# Patient Record
Sex: MALE | Race: WHITE | NOT HISPANIC OR LATINO | Employment: FULL TIME | URBAN - METROPOLITAN AREA
[De-identification: names, ages, dates, MRNs, and addresses within clinical notes are randomized per-mention and may not be internally consistent; named-entity substitution may affect disease eponyms.]

---

## 2023-05-16 ENCOUNTER — TELEPHONE (OUTPATIENT)
Dept: PRIMARY CARE | Facility: CLINIC | Age: 58
End: 2023-05-16
Payer: COMMERCIAL

## 2023-05-16 DIAGNOSIS — Z00.00 HEALTH MAINTENANCE EXAMINATION: ICD-10-CM

## 2023-05-16 NOTE — TELEPHONE ENCOUNTER
Pt is scheduled for physical 06/29/23  Need Labs will go to lab downstairs  Pt also is tired all the times insurance won't pay for Thyroid testing unless it is deemed medically necessary.

## 2023-05-23 DIAGNOSIS — Z00.00 ENCOUNTER FOR GENERAL ADULT MEDICAL EXAMINATION WITHOUT ABNORMAL FINDINGS: ICD-10-CM

## 2023-05-23 RX ORDER — ATORVASTATIN CALCIUM 20 MG/1
TABLET, FILM COATED ORAL
Qty: 90 TABLET | Refills: 3 | Status: SHIPPED | OUTPATIENT
Start: 2023-05-23

## 2023-06-15 ENCOUNTER — LAB (OUTPATIENT)
Dept: LAB | Facility: LAB | Age: 58
End: 2023-06-15
Payer: COMMERCIAL

## 2023-06-15 DIAGNOSIS — Z00.00 HEALTH MAINTENANCE EXAMINATION: ICD-10-CM

## 2023-06-15 LAB
ALANINE AMINOTRANSFERASE (SGPT) (U/L) IN SER/PLAS: 45 U/L (ref 10–52)
ALBUMIN (G/DL) IN SER/PLAS: 4.5 G/DL (ref 3.4–5)
ALKALINE PHOSPHATASE (U/L) IN SER/PLAS: 74 U/L (ref 33–120)
ANION GAP IN SER/PLAS: 16 MMOL/L (ref 10–20)
ASPARTATE AMINOTRANSFERASE (SGOT) (U/L) IN SER/PLAS: 23 U/L (ref 9–39)
BASOPHILS (10*3/UL) IN BLOOD BY AUTOMATED COUNT: 0.05 X10E9/L (ref 0–0.1)
BASOPHILS/100 LEUKOCYTES IN BLOOD BY AUTOMATED COUNT: 0.8 % (ref 0–2)
BILIRUBIN TOTAL (MG/DL) IN SER/PLAS: 1 MG/DL (ref 0–1.2)
CALCIUM (MG/DL) IN SER/PLAS: 10.3 MG/DL (ref 8.6–10.6)
CARBON DIOXIDE, TOTAL (MMOL/L) IN SER/PLAS: 28 MMOL/L (ref 21–32)
CHLORIDE (MMOL/L) IN SER/PLAS: 102 MMOL/L (ref 98–107)
CHOLESTEROL (MG/DL) IN SER/PLAS: 206 MG/DL (ref 0–199)
CHOLESTEROL IN HDL (MG/DL) IN SER/PLAS: 59.5 MG/DL
CHOLESTEROL/HDL RATIO: 3.5
CREATININE (MG/DL) IN SER/PLAS: 0.99 MG/DL (ref 0.5–1.3)
EOSINOPHILS (10*3/UL) IN BLOOD BY AUTOMATED COUNT: 0.22 X10E9/L (ref 0–0.7)
EOSINOPHILS/100 LEUKOCYTES IN BLOOD BY AUTOMATED COUNT: 3.5 % (ref 0–6)
ERYTHROCYTE DISTRIBUTION WIDTH (RATIO) BY AUTOMATED COUNT: 11.8 % (ref 11.5–14.5)
ERYTHROCYTE MEAN CORPUSCULAR HEMOGLOBIN CONCENTRATION (G/DL) BY AUTOMATED: 33.1 G/DL (ref 32–36)
ERYTHROCYTE MEAN CORPUSCULAR VOLUME (FL) BY AUTOMATED COUNT: 90 FL (ref 80–100)
ERYTHROCYTES (10*6/UL) IN BLOOD BY AUTOMATED COUNT: 5.11 X10E12/L (ref 4.5–5.9)
GFR MALE: 89 ML/MIN/1.73M2
GLUCOSE (MG/DL) IN SER/PLAS: 100 MG/DL (ref 74–99)
HEMATOCRIT (%) IN BLOOD BY AUTOMATED COUNT: 46.2 % (ref 41–52)
HEMOGLOBIN (G/DL) IN BLOOD: 15.3 G/DL (ref 13.5–17.5)
IMMATURE GRANULOCYTES/100 LEUKOCYTES IN BLOOD BY AUTOMATED COUNT: 0.5 % (ref 0–0.9)
LDL: 123 MG/DL (ref 0–99)
LEUKOCYTES (10*3/UL) IN BLOOD BY AUTOMATED COUNT: 6.3 X10E9/L (ref 4.4–11.3)
LYMPHOCYTES (10*3/UL) IN BLOOD BY AUTOMATED COUNT: 1.46 X10E9/L (ref 1.2–4.8)
LYMPHOCYTES/100 LEUKOCYTES IN BLOOD BY AUTOMATED COUNT: 23.2 % (ref 13–44)
MONOCYTES (10*3/UL) IN BLOOD BY AUTOMATED COUNT: 0.51 X10E9/L (ref 0.1–1)
MONOCYTES/100 LEUKOCYTES IN BLOOD BY AUTOMATED COUNT: 8.1 % (ref 2–10)
NEUTROPHILS (10*3/UL) IN BLOOD BY AUTOMATED COUNT: 4.03 X10E9/L (ref 1.2–7.7)
NEUTROPHILS/100 LEUKOCYTES IN BLOOD BY AUTOMATED COUNT: 63.9 % (ref 40–80)
NRBC (PER 100 WBCS) BY AUTOMATED COUNT: 0 /100 WBC (ref 0–0)
PLATELETS (10*3/UL) IN BLOOD AUTOMATED COUNT: 291 X10E9/L (ref 150–450)
POTASSIUM (MMOL/L) IN SER/PLAS: 4.6 MMOL/L (ref 3.5–5.3)
PROSTATE SPECIFIC AG (NG/ML) IN SER/PLAS: 0.78 NG/ML (ref 0–4)
PROTEIN TOTAL: 7 G/DL (ref 6.4–8.2)
SODIUM (MMOL/L) IN SER/PLAS: 141 MMOL/L (ref 136–145)
TRIGLYCERIDE (MG/DL) IN SER/PLAS: 118 MG/DL (ref 0–149)
UREA NITROGEN (MG/DL) IN SER/PLAS: 14 MG/DL (ref 6–23)
VLDL: 24 MG/DL (ref 0–40)

## 2023-06-15 PROCEDURE — 80053 COMPREHEN METABOLIC PANEL: CPT

## 2023-06-15 PROCEDURE — 36415 COLL VENOUS BLD VENIPUNCTURE: CPT

## 2023-06-15 PROCEDURE — 85025 COMPLETE CBC W/AUTO DIFF WBC: CPT

## 2023-06-15 PROCEDURE — 80061 LIPID PANEL: CPT

## 2023-06-15 PROCEDURE — 84153 ASSAY OF PSA TOTAL: CPT

## 2023-06-28 ASSESSMENT — PROMIS GLOBAL HEALTH SCALE
EMOTIONAL_PROBLEMS: RARELY
RATE_GENERAL_HEALTH: GOOD
RATE_SOCIAL_SATISFACTION: GOOD
RATE_QUALITY_OF_LIFE: VERY GOOD
RATE_AVERAGE_PAIN: 0
RATE_MENTAL_HEALTH: GOOD
RATE_PHYSICAL_HEALTH: GOOD
CARRYOUT_PHYSICAL_ACTIVITIES: COMPLETELY
CARRYOUT_SOCIAL_ACTIVITIES: VERY GOOD

## 2023-06-29 ENCOUNTER — OFFICE VISIT (OUTPATIENT)
Dept: PRIMARY CARE | Facility: CLINIC | Age: 58
End: 2023-06-29
Payer: COMMERCIAL

## 2023-06-29 VITALS
TEMPERATURE: 97.4 F | OXYGEN SATURATION: 98 % | HEIGHT: 68 IN | SYSTOLIC BLOOD PRESSURE: 142 MMHG | WEIGHT: 231 LBS | HEART RATE: 72 BPM | BODY MASS INDEX: 35.01 KG/M2 | DIASTOLIC BLOOD PRESSURE: 80 MMHG

## 2023-06-29 DIAGNOSIS — Z00.00 ENCOUNTER FOR ROUTINE HISTORY AND PHYSICAL EXAM FOR MALE: ICD-10-CM

## 2023-06-29 DIAGNOSIS — K44.9 HIATAL HERNIA: ICD-10-CM

## 2023-06-29 DIAGNOSIS — Z23 NEED FOR VACCINATION: ICD-10-CM

## 2023-06-29 DIAGNOSIS — S83.519D RUPTURE OF ANTERIOR CRUCIATE LIGAMENT OF KNEE, UNSPECIFIED LATERALITY, SUBSEQUENT ENCOUNTER: ICD-10-CM

## 2023-06-29 DIAGNOSIS — E78.5 HYPERLIPIDEMIA, UNSPECIFIED HYPERLIPIDEMIA TYPE: Primary | ICD-10-CM

## 2023-06-29 DIAGNOSIS — I10 PRIMARY HYPERTENSION: ICD-10-CM

## 2023-06-29 PROBLEM — S83.419A MEDIAL COLLATERAL LIGAMENT SPRAIN OF KNEE: Status: ACTIVE | Noted: 2023-06-29

## 2023-06-29 PROBLEM — J02.9 PHARYNGITIS: Status: RESOLVED | Noted: 2023-06-29 | Resolved: 2023-06-29

## 2023-06-29 PROBLEM — J30.9 ALLERGIC RHINITIS: Status: ACTIVE | Noted: 2023-06-29

## 2023-06-29 PROBLEM — L82.1 SEBORRHEIC KERATOSIS: Status: ACTIVE | Noted: 2023-06-29

## 2023-06-29 PROBLEM — J98.01 ACUTE BRONCHOSPASM: Status: ACTIVE | Noted: 2023-06-29

## 2023-06-29 PROBLEM — M25.562 LEFT KNEE PAIN: Status: RESOLVED | Noted: 2023-06-29 | Resolved: 2023-06-29

## 2023-06-29 PROBLEM — L98.9 CHANGING SKIN LESION: Status: ACTIVE | Noted: 2023-06-29

## 2023-06-29 PROBLEM — M25.561 RIGHT KNEE PAIN: Status: RESOLVED | Noted: 2023-06-29 | Resolved: 2023-06-29

## 2023-06-29 PROBLEM — S39.012A LUMBAR STRAIN: Status: ACTIVE | Noted: 2023-06-29

## 2023-06-29 PROBLEM — R49.0 HOARSENESS: Status: ACTIVE | Noted: 2023-06-29

## 2023-06-29 PROBLEM — R05.9 COUGH: Status: ACTIVE | Noted: 2023-06-29

## 2023-06-29 PROBLEM — M25.562 LEFT KNEE PAIN: Status: ACTIVE | Noted: 2023-06-29

## 2023-06-29 PROBLEM — L57.0 ACTINIC KERATOSIS OF SCALP: Status: ACTIVE | Noted: 2023-06-29

## 2023-06-29 PROBLEM — M25.552 LEFT HIP PAIN: Status: RESOLVED | Noted: 2023-06-29 | Resolved: 2023-06-29

## 2023-06-29 PROBLEM — J02.9 PHARYNGITIS: Status: ACTIVE | Noted: 2023-06-29

## 2023-06-29 PROBLEM — R74.8 LIVER ENZYME ELEVATION: Status: RESOLVED | Noted: 2023-06-29 | Resolved: 2023-06-29

## 2023-06-29 PROBLEM — S83.206A RIGHT KNEE MENISCAL TEAR: Status: RESOLVED | Noted: 2023-06-29 | Resolved: 2023-06-29

## 2023-06-29 PROBLEM — R06.02 SHORTNESS OF BREATH: Status: ACTIVE | Noted: 2019-01-30

## 2023-06-29 PROBLEM — J30.9 ALLERGIC RHINITIS: Status: RESOLVED | Noted: 2023-06-29 | Resolved: 2023-06-29

## 2023-06-29 PROBLEM — S83.512A LEFT ACL TEAR: Status: ACTIVE | Noted: 2023-06-29

## 2023-06-29 PROBLEM — S83.206A RIGHT KNEE MENISCAL TEAR: Status: ACTIVE | Noted: 2023-06-29

## 2023-06-29 PROBLEM — S83.512A LEFT ACL TEAR: Status: RESOLVED | Noted: 2023-06-29 | Resolved: 2023-06-29

## 2023-06-29 PROBLEM — R06.02 SHORTNESS OF BREATH: Status: RESOLVED | Noted: 2019-01-30 | Resolved: 2023-06-29

## 2023-06-29 PROBLEM — R49.0 HOARSENESS: Status: RESOLVED | Noted: 2023-06-29 | Resolved: 2023-06-29

## 2023-06-29 PROBLEM — S76.219A STRAIN OF GROIN: Status: RESOLVED | Noted: 2023-06-29 | Resolved: 2023-06-29

## 2023-06-29 PROBLEM — S83.519A TORN ACL: Status: ACTIVE | Noted: 2023-06-29

## 2023-06-29 PROBLEM — R74.8 LIVER ENZYME ELEVATION: Status: ACTIVE | Noted: 2023-06-29

## 2023-06-29 PROBLEM — H61.23 IMPACTED CERUMEN OF BOTH EARS: Status: ACTIVE | Noted: 2023-06-29

## 2023-06-29 PROBLEM — M25.552 LEFT HIP PAIN: Status: ACTIVE | Noted: 2023-06-29

## 2023-06-29 PROBLEM — S76.219A STRAIN OF GROIN: Status: ACTIVE | Noted: 2023-06-29

## 2023-06-29 PROBLEM — M25.569 JOINT PAIN, KNEE: Status: ACTIVE | Noted: 2023-06-29

## 2023-06-29 PROBLEM — M25.561 RIGHT KNEE PAIN: Status: ACTIVE | Noted: 2023-06-29

## 2023-06-29 PROCEDURE — 3077F SYST BP >= 140 MM HG: CPT | Performed by: FAMILY MEDICINE

## 2023-06-29 PROCEDURE — 93000 ELECTROCARDIOGRAM COMPLETE: CPT | Performed by: FAMILY MEDICINE

## 2023-06-29 PROCEDURE — 90471 IMMUNIZATION ADMIN: CPT | Performed by: FAMILY MEDICINE

## 2023-06-29 PROCEDURE — 99386 PREV VISIT NEW AGE 40-64: CPT | Performed by: FAMILY MEDICINE

## 2023-06-29 PROCEDURE — 90715 TDAP VACCINE 7 YRS/> IM: CPT | Performed by: FAMILY MEDICINE

## 2023-06-29 PROCEDURE — 3079F DIAST BP 80-89 MM HG: CPT | Performed by: FAMILY MEDICINE

## 2023-06-29 PROCEDURE — 1036F TOBACCO NON-USER: CPT | Performed by: FAMILY MEDICINE

## 2023-06-29 RX ORDER — OMEPRAZOLE 20 MG/1
1 CAPSULE, DELAYED RELEASE ORAL DAILY
COMMUNITY

## 2023-06-29 RX ORDER — MULTIVITAMIN
1 TABLET ORAL DAILY
COMMUNITY
Start: 2020-02-17

## 2023-06-29 RX ORDER — BACLOFEN 20 MG
1 TABLET ORAL DAILY
COMMUNITY

## 2023-06-29 RX ORDER — IBUPROFEN 200 MG
1 CAPSULE ORAL DAILY
COMMUNITY
Start: 2020-02-17

## 2023-06-29 ASSESSMENT — ENCOUNTER SYMPTOMS
DIARRHEA: 0
CONSTIPATION: 0
EYE REDNESS: 0
UNEXPECTED WEIGHT CHANGE: 0
FREQUENCY: 0
VOMITING: 0
FLANK PAIN: 0
SPEECH DIFFICULTY: 0
DIZZINESS: 0
SLEEP DISTURBANCE: 0
CARDIOVASCULAR NEGATIVE: 1
NAUSEA: 0
BRUISES/BLEEDS EASILY: 0
RHINORRHEA: 1
CHEST TIGHTNESS: 0
LIGHT-HEADEDNESS: 0
EYE PAIN: 0
COUGH: 0
CHILLS: 0
DECREASED CONCENTRATION: 0
ABDOMINAL PAIN: 0
ARTHRALGIAS: 0
VOICE CHANGE: 0
TROUBLE SWALLOWING: 0
DIAPHORESIS: 0
BLOOD IN STOOL: 0
FACIAL ASYMMETRY: 0
ADENOPATHY: 0
SEIZURES: 0
APPETITE CHANGE: 0
SINUS PRESSURE: 0
HEADACHES: 0
NUMBNESS: 0
MYALGIAS: 0
DIFFICULTY URINATING: 0
SINUS PAIN: 0
ANAL BLEEDING: 0
FATIGUE: 0
PALPITATIONS: 0
NERVOUS/ANXIOUS: 0
AGITATION: 0
EYE DISCHARGE: 0
WOUND: 0
ABDOMINAL DISTENTION: 0
DYSURIA: 0
SORE THROAT: 0
EYE ITCHING: 0
BACK PAIN: 0

## 2023-06-29 ASSESSMENT — PATIENT HEALTH QUESTIONNAIRE - PHQ9
2. FEELING DOWN, DEPRESSED OR HOPELESS: NOT AT ALL
1. LITTLE INTEREST OR PLEASURE IN DOING THINGS: NOT AT ALL
SUM OF ALL RESPONSES TO PHQ9 QUESTIONS 1 AND 2: 0

## 2023-06-29 ASSESSMENT — COLUMBIA-SUICIDE SEVERITY RATING SCALE - C-SSRS
2. HAVE YOU ACTUALLY HAD ANY THOUGHTS OF KILLING YOURSELF?: NO
6. HAVE YOU EVER DONE ANYTHING, STARTED TO DO ANYTHING, OR PREPARED TO DO ANYTHING TO END YOUR LIFE?: NO
1. IN THE PAST MONTH, HAVE YOU WISHED YOU WERE DEAD OR WISHED YOU COULD GO TO SLEEP AND NOT WAKE UP?: NO
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
## 2023-06-29 NOTE — PATIENT INSTRUCTIONS
Overall doing well.  Labs look good.  Please try to get back to exercising 40 minutes 4 times weekly.  Please reduce alcohol intake to taking 1 drink daily.    EKG performed and reviewed.    Plan to continue present medical regimen.    Updating tetanus booster today.

## 2024-07-30 DIAGNOSIS — Z00.00 ENCOUNTER FOR GENERAL ADULT MEDICAL EXAMINATION WITHOUT ABNORMAL FINDINGS: ICD-10-CM

## 2024-07-30 RX ORDER — ATORVASTATIN CALCIUM 20 MG/1
TABLET, FILM COATED ORAL
Qty: 90 TABLET | Refills: 3 | Status: SHIPPED | OUTPATIENT
Start: 2024-07-30

## 2024-10-24 ENCOUNTER — TELEPHONE (OUTPATIENT)
Dept: PRIMARY CARE | Facility: CLINIC | Age: 59
End: 2024-10-24
Payer: COMMERCIAL

## 2024-10-24 DIAGNOSIS — Z00.00 ENCOUNTER FOR ROUTINE HISTORY AND PHYSICAL EXAM FOR MALE: ICD-10-CM

## 2024-12-23 ENCOUNTER — LAB (OUTPATIENT)
Dept: LAB | Facility: LAB | Age: 59
End: 2024-12-23
Payer: COMMERCIAL

## 2024-12-23 DIAGNOSIS — Z00.00 ENCOUNTER FOR ROUTINE HISTORY AND PHYSICAL EXAM FOR MALE: ICD-10-CM

## 2024-12-23 LAB
ALBUMIN SERPL BCP-MCNC: 4.7 G/DL (ref 3.4–5)
ALP SERPL-CCNC: 74 U/L (ref 33–120)
ALT SERPL W P-5'-P-CCNC: 41 U/L (ref 10–52)
ANION GAP SERPL CALC-SCNC: 13 MMOL/L (ref 10–20)
AST SERPL W P-5'-P-CCNC: 21 U/L (ref 9–39)
BASOPHILS # BLD AUTO: 0.09 X10*3/UL (ref 0–0.1)
BASOPHILS NFR BLD AUTO: 1.5 %
BILIRUB SERPL-MCNC: 0.9 MG/DL (ref 0–1.2)
BUN SERPL-MCNC: 15 MG/DL (ref 6–23)
CALCIUM SERPL-MCNC: 10.4 MG/DL (ref 8.6–10.6)
CHLORIDE SERPL-SCNC: 101 MMOL/L (ref 98–107)
CHOLEST SERPL-MCNC: 191 MG/DL (ref 0–199)
CHOLESTEROL/HDL RATIO: 3.6
CO2 SERPL-SCNC: 30 MMOL/L (ref 21–32)
CREAT SERPL-MCNC: 0.92 MG/DL (ref 0.5–1.3)
EGFRCR SERPLBLD CKD-EPI 2021: >90 ML/MIN/1.73M*2
EOSINOPHIL # BLD AUTO: 0.44 X10*3/UL (ref 0–0.7)
EOSINOPHIL NFR BLD AUTO: 7.4 %
ERYTHROCYTE [DISTWIDTH] IN BLOOD BY AUTOMATED COUNT: 11.5 % (ref 11.5–14.5)
GLUCOSE SERPL-MCNC: 93 MG/DL (ref 74–99)
HCT VFR BLD AUTO: 46.1 % (ref 41–52)
HDLC SERPL-MCNC: 53.7 MG/DL
HGB BLD-MCNC: 15.8 G/DL (ref 13.5–17.5)
IMM GRANULOCYTES # BLD AUTO: 0.02 X10*3/UL (ref 0–0.7)
IMM GRANULOCYTES NFR BLD AUTO: 0.3 % (ref 0–0.9)
LDLC SERPL CALC-MCNC: 111 MG/DL
LYMPHOCYTES # BLD AUTO: 1.52 X10*3/UL (ref 1.2–4.8)
LYMPHOCYTES NFR BLD AUTO: 25.6 %
MCH RBC QN AUTO: 30.7 PG (ref 26–34)
MCHC RBC AUTO-ENTMCNC: 34.3 G/DL (ref 32–36)
MCV RBC AUTO: 90 FL (ref 80–100)
MONOCYTES # BLD AUTO: 0.4 X10*3/UL (ref 0.1–1)
MONOCYTES NFR BLD AUTO: 6.7 %
NEUTROPHILS # BLD AUTO: 3.46 X10*3/UL (ref 1.2–7.7)
NEUTROPHILS NFR BLD AUTO: 58.5 %
NON HDL CHOLESTEROL: 137 MG/DL (ref 0–149)
NRBC BLD-RTO: 0 /100 WBCS (ref 0–0)
PLATELET # BLD AUTO: 291 X10*3/UL (ref 150–450)
POTASSIUM SERPL-SCNC: 4.2 MMOL/L (ref 3.5–5.3)
PROT SERPL-MCNC: 7.1 G/DL (ref 6.4–8.2)
PSA SERPL-MCNC: 0.83 NG/ML
RBC # BLD AUTO: 5.15 X10*6/UL (ref 4.5–5.9)
SODIUM SERPL-SCNC: 140 MMOL/L (ref 136–145)
TRIGL SERPL-MCNC: 130 MG/DL (ref 0–149)
TSH SERPL-ACNC: 1.09 MIU/L (ref 0.44–3.98)
VLDL: 26 MG/DL (ref 0–40)
WBC # BLD AUTO: 5.9 X10*3/UL (ref 4.4–11.3)

## 2024-12-23 PROCEDURE — 84443 ASSAY THYROID STIM HORMONE: CPT

## 2024-12-23 PROCEDURE — 84153 ASSAY OF PSA TOTAL: CPT

## 2024-12-23 PROCEDURE — 80053 COMPREHEN METABOLIC PANEL: CPT

## 2024-12-23 PROCEDURE — 85025 COMPLETE CBC W/AUTO DIFF WBC: CPT

## 2024-12-23 PROCEDURE — 80061 LIPID PANEL: CPT

## 2025-01-09 ENCOUNTER — APPOINTMENT (OUTPATIENT)
Dept: PRIMARY CARE | Facility: CLINIC | Age: 60
End: 2025-01-09
Payer: COMMERCIAL

## 2025-01-09 ENCOUNTER — HOSPITAL ENCOUNTER (OUTPATIENT)
Dept: RADIOLOGY | Facility: CLINIC | Age: 60
Discharge: HOME | End: 2025-01-09
Payer: COMMERCIAL

## 2025-01-09 VITALS
BODY MASS INDEX: 34.36 KG/M2 | DIASTOLIC BLOOD PRESSURE: 80 MMHG | TEMPERATURE: 97 F | WEIGHT: 232 LBS | OXYGEN SATURATION: 95 % | HEIGHT: 69 IN | SYSTOLIC BLOOD PRESSURE: 124 MMHG | HEART RATE: 80 BPM

## 2025-01-09 DIAGNOSIS — R06.2 WHEEZING: ICD-10-CM

## 2025-01-09 DIAGNOSIS — L20.9 ATOPIC DERMATITIS, UNSPECIFIED TYPE: ICD-10-CM

## 2025-01-09 DIAGNOSIS — L98.9 CHANGING SKIN LESION: ICD-10-CM

## 2025-01-09 DIAGNOSIS — Z00.00 ROUTINE ADULT HEALTH MAINTENANCE: ICD-10-CM

## 2025-01-09 DIAGNOSIS — I10 PRIMARY HYPERTENSION: ICD-10-CM

## 2025-01-09 DIAGNOSIS — E78.5 HYPERLIPIDEMIA, UNSPECIFIED HYPERLIPIDEMIA TYPE: ICD-10-CM

## 2025-01-09 DIAGNOSIS — Z00.00 ENCOUNTER FOR ROUTINE HISTORY AND PHYSICAL EXAM FOR MALE: Primary | ICD-10-CM

## 2025-01-09 PROBLEM — J98.01 ACUTE BRONCHOSPASM: Status: RESOLVED | Noted: 2023-06-29 | Resolved: 2025-01-09

## 2025-01-09 PROBLEM — H61.23 IMPACTED CERUMEN OF BOTH EARS: Status: RESOLVED | Noted: 2023-06-29 | Resolved: 2025-01-09

## 2025-01-09 PROBLEM — R05.9 COUGH: Status: RESOLVED | Noted: 2023-06-29 | Resolved: 2025-01-09

## 2025-01-09 PROBLEM — S39.012A LUMBAR STRAIN: Status: RESOLVED | Noted: 2023-06-29 | Resolved: 2025-01-09

## 2025-01-09 PROBLEM — K44.9 HIATAL HERNIA: Status: RESOLVED | Noted: 2023-06-29 | Resolved: 2025-01-09

## 2025-01-09 PROBLEM — S83.519A TORN ACL: Status: RESOLVED | Noted: 2023-06-29 | Resolved: 2025-01-09

## 2025-01-09 PROBLEM — M25.569 JOINT PAIN, KNEE: Status: RESOLVED | Noted: 2023-06-29 | Resolved: 2025-01-09

## 2025-01-09 PROBLEM — S83.419A MEDIAL COLLATERAL LIGAMENT SPRAIN OF KNEE: Status: RESOLVED | Noted: 2023-06-29 | Resolved: 2025-01-09

## 2025-01-09 PROBLEM — L82.1 SEBORRHEIC KERATOSIS: Status: RESOLVED | Noted: 2023-06-29 | Resolved: 2025-01-09

## 2025-01-09 PROBLEM — L57.0 ACTINIC KERATOSIS OF SCALP: Status: RESOLVED | Noted: 2023-06-29 | Resolved: 2025-01-09

## 2025-01-09 PROCEDURE — 93000 ELECTROCARDIOGRAM COMPLETE: CPT | Performed by: FAMILY MEDICINE

## 2025-01-09 PROCEDURE — 1036F TOBACCO NON-USER: CPT | Performed by: FAMILY MEDICINE

## 2025-01-09 PROCEDURE — 3079F DIAST BP 80-89 MM HG: CPT | Performed by: FAMILY MEDICINE

## 2025-01-09 PROCEDURE — 99396 PREV VISIT EST AGE 40-64: CPT | Performed by: FAMILY MEDICINE

## 2025-01-09 PROCEDURE — 3074F SYST BP LT 130 MM HG: CPT | Performed by: FAMILY MEDICINE

## 2025-01-09 PROCEDURE — 71046 X-RAY EXAM CHEST 2 VIEWS: CPT

## 2025-01-09 PROCEDURE — 3008F BODY MASS INDEX DOCD: CPT | Performed by: FAMILY MEDICINE

## 2025-01-09 RX ORDER — ALBUTEROL SULFATE 90 UG/1
2 INHALANT RESPIRATORY (INHALATION) EVERY 4 HOURS PRN
Qty: 8.5 G | Refills: 5 | Status: SHIPPED | OUTPATIENT
Start: 2025-01-09 | End: 2026-01-09

## 2025-01-09 RX ORDER — MOMETASONE FUROATE 1 MG/G
OINTMENT TOPICAL DAILY
Qty: 45 G | Refills: 0 | Status: SHIPPED | OUTPATIENT
Start: 2025-01-09 | End: 2026-01-09

## 2025-01-09 RX ORDER — FLUTICASONE PROPIONATE AND SALMETEROL 100; 50 UG/1; UG/1
1 POWDER RESPIRATORY (INHALATION)
Qty: 60 EACH | Refills: 0 | Status: SHIPPED | OUTPATIENT
Start: 2025-01-09 | End: 2026-01-09

## 2025-01-09 ASSESSMENT — ENCOUNTER SYMPTOMS
WOUND: 1
WHEEZING: 1
EYE PAIN: 0
FLANK PAIN: 0
POLYPHAGIA: 0
ADENOPATHY: 0
SEIZURES: 0
COLOR CHANGE: 1
DECREASED CONCENTRATION: 0
UNEXPECTED WEIGHT CHANGE: 0
HALLUCINATIONS: 0
BACK PAIN: 0
EYE DISCHARGE: 0
LIGHT-HEADEDNESS: 0
EYE REDNESS: 0
STRIDOR: 0
ABDOMINAL DISTENTION: 0
DEPRESSION: 0
HEADACHES: 0
NAUSEA: 0
CHEST TIGHTNESS: 0
COUGH: 1
NERVOUS/ANXIOUS: 0
TROUBLE SWALLOWING: 0
APPETITE CHANGE: 0
SINUS PAIN: 0
CONSTIPATION: 0
NUMBNESS: 0
DYSURIA: 0
MYALGIAS: 0
BLOOD IN STOOL: 0
VOICE CHANGE: 0
HEMATURIA: 0
FACIAL SWELLING: 0
SLEEP DISTURBANCE: 0
TREMORS: 0
EYE ITCHING: 0
ANAL BLEEDING: 0
PSYCHIATRIC NEGATIVE: 1
SPEECH DIFFICULTY: 0
FACIAL ASYMMETRY: 0
LOSS OF SENSATION IN FEET: 0
EYES NEGATIVE: 1
ARTHRALGIAS: 0
APNEA: 0
MUSCULOSKELETAL NEGATIVE: 1
RHINORRHEA: 0
BRUISES/BLEEDS EASILY: 0
PALPITATIONS: 0
ABDOMINAL PAIN: 0
AGITATION: 0
DIZZINESS: 0
SORE THROAT: 0
FREQUENCY: 0
POLYDIPSIA: 0
CARDIOVASCULAR NEGATIVE: 1
DIFFICULTY URINATING: 0
OCCASIONAL FEELINGS OF UNSTEADINESS: 0
DIARRHEA: 0
SINUS PRESSURE: 0
FATIGUE: 0
JOINT SWELLING: 0
VOMITING: 0
CHILLS: 0

## 2025-01-09 ASSESSMENT — PROMIS GLOBAL HEALTH SCALE
RATE_MENTAL_HEALTH: GOOD
RATE_AVERAGE_PAIN: 3
RATE_GENERAL_HEALTH: GOOD
RATE_AVERAGE_FATIGUE: MILD
RATE_QUALITY_OF_LIFE: VERY GOOD
RATE_SOCIAL_SATISFACTION: GOOD
RATE_PHYSICAL_HEALTH: GOOD
CARRYOUT_PHYSICAL_ACTIVITIES: COMPLETELY
EMOTIONAL_PROBLEMS: NEVER
CARRYOUT_SOCIAL_ACTIVITIES: VERY GOOD

## 2025-01-09 ASSESSMENT — PATIENT HEALTH QUESTIONNAIRE - PHQ9
SUM OF ALL RESPONSES TO PHQ9 QUESTIONS 1 AND 2: 0
1. LITTLE INTEREST OR PLEASURE IN DOING THINGS: NOT AT ALL
2. FEELING DOWN, DEPRESSED OR HOPELESS: NOT AT ALL
10. IF YOU CHECKED OFF ANY PROBLEMS, HOW DIFFICULT HAVE THESE PROBLEMS MADE IT FOR YOU TO DO YOUR WORK, TAKE CARE OF THINGS AT HOME, OR GET ALONG WITH OTHER PEOPLE: NOT DIFFICULT AT ALL

## 2025-01-09 ASSESSMENT — COLUMBIA-SUICIDE SEVERITY RATING SCALE - C-SSRS
1. IN THE PAST MONTH, HAVE YOU WISHED YOU WERE DEAD OR WISHED YOU COULD GO TO SLEEP AND NOT WAKE UP?: NO
6. HAVE YOU EVER DONE ANYTHING, STARTED TO DO ANYTHING, OR PREPARED TO DO ANYTHING TO END YOUR LIFE?: NO
2. HAVE YOU ACTUALLY HAD ANY THOUGHTS OF KILLING YOURSELF?: NO

## 2025-01-09 NOTE — PROGRESS NOTES
Subjective   Patient ID: Bradley Coates is a 59 y.o. male who presents for Annual Exam.    Some wheezing by end of day.  August 23.  Sinuses and coughing.  Nyquil.  Patient started in August with coughing and wheezing.  States that he felt sick at that time but since then he still had the wheezing from time to time    He does have pets at home but these are not new did get some new pets about a month after this started the cough is nonproductive.    He has no paroxysmal nocturnal dyspnea.  No history of asthma.    No troubles with nausea no vomiting no troubles with GE reflux.  No troubles with blood in the stool or black tarry stool.    Does have a skin lesion posterior aspect of the right calf area.  States has been picking at it has a little scab on it at this time.  Also has a area above where he has been scratching states it has been a dry patch she has had 1 narrowing on the abdomen there is been no bleeding.    L  pain         Alcohol intake: 12 beers a week  Caffeine intake: 2 cups in am  Exercise: 15,000 steps a day    Last Colonoscopy: 2019  Last Pap smear: N/A  Mammogram:N/A  Last Dexa scan:N/A    Shingles vaccine: recommended  TdaP vaccine:     Review of Systems   Constitutional:  Negative for appetite change, chills, fatigue and unexpected weight change.   HENT:  Positive for congestion and sneezing. Negative for dental problem, drooling, ear discharge, ear pain, facial swelling, hearing loss, mouth sores, nosebleeds, postnasal drip, rhinorrhea, sinus pressure, sinus pain, sore throat, tinnitus, trouble swallowing and voice change.    Eyes: Negative.  Negative for pain, discharge, redness, itching and visual disturbance.   Respiratory:  Positive for cough and wheezing. Negative for apnea, chest tightness and stridor.    Cardiovascular: Negative.  Negative for chest pain, palpitations and leg swelling.   Gastrointestinal:  Negative for abdominal distention, abdominal pain, anal bleeding, blood in  "stool, constipation, diarrhea, nausea and vomiting.   Endocrine: Negative for cold intolerance, heat intolerance, polydipsia, polyphagia and polyuria.   Genitourinary:  Negative for difficulty urinating, dysuria, enuresis, flank pain, frequency, hematuria and penile swelling.   Musculoskeletal: Negative.  Negative for arthralgias, back pain, joint swelling and myalgias.   Skin:  Positive for color change and wound. Negative for rash.   Allergic/Immunologic: Negative for environmental allergies, food allergies and immunocompromised state.   Neurological:  Negative for dizziness, tremors, seizures, facial asymmetry, speech difficulty, light-headedness, numbness and headaches.   Hematological:  Negative for adenopathy. Does not bruise/bleed easily.   Psychiatric/Behavioral: Negative.  Negative for agitation, behavioral problems, decreased concentration, hallucinations, sleep disturbance and suicidal ideas. The patient is not nervous/anxious.        Objective   /80   Pulse 80   Temp 36.1 °C (97 °F)   Ht 1.74 m (5' 8.5\")   Wt 105 kg (232 lb)   SpO2 95%   BMI 34.76 kg/m²   BSA Body surface area is 2.25 meters squared.      Physical Exam  Constitutional:       General: He is not in acute distress.     Appearance: Normal appearance. He is not ill-appearing or toxic-appearing.   HENT:      Head: Normocephalic.      Right Ear: Tympanic membrane and ear canal normal.      Left Ear: Tympanic membrane and ear canal normal.      Nose: Nose normal.      Mouth/Throat:      Mouth: Mucous membranes are moist.   Eyes:      Extraocular Movements: Extraocular movements intact.      Conjunctiva/sclera: Conjunctivae normal.      Pupils: Pupils are equal, round, and reactive to light.   Cardiovascular:      Rate and Rhythm: Normal rate and regular rhythm.      Pulses: Normal pulses.      Heart sounds: Normal heart sounds.   Pulmonary:      Effort: Pulmonary effort is normal.      Breath sounds: Normal breath sounds. No wheezing " or rhonchi.      Comments: Few end expiratory wheezes that clear with coughing  Abdominal:      General: Abdomen is flat. Bowel sounds are normal. There is no distension.      Palpations: Abdomen is soft.      Tenderness: There is no abdominal tenderness. There is no right CVA tenderness or rebound.      Hernia: No hernia is present.   Genitourinary:     Penis: Normal.       Testes: Normal.      Prostate: Normal.   Musculoskeletal:         General: Normal range of motion.      Cervical back: Normal range of motion. No rigidity.      Right lower leg: No edema.   Lymphadenopathy:      Cervical: No cervical adenopathy.   Skin:     General: Skin is warm and dry.      Capillary Refill: Capillary refill takes less than 2 seconds.      Findings: No bruising.   Neurological:      General: No focal deficit present.      Mental Status: He is alert and oriented to person, place, and time.      Cranial Nerves: No cranial nerve deficit.      Sensory: No sensory deficit.      Motor: No weakness.      Coordination: Coordination normal.      Gait: Gait normal.      Deep Tendon Reflexes: Reflexes normal.   Psychiatric:         Mood and Affect: Mood normal.         Behavior: Behavior normal.         Thought Content: Thought content normal.       Lab on 12/23/2024   Component Date Value Ref Range Status    WBC 12/23/2024 5.9  4.4 - 11.3 x10*3/uL Final    nRBC 12/23/2024 0.0  0.0 - 0.0 /100 WBCs Final    RBC 12/23/2024 5.15  4.50 - 5.90 x10*6/uL Final    Hemoglobin 12/23/2024 15.8  13.5 - 17.5 g/dL Final    Hematocrit 12/23/2024 46.1  41.0 - 52.0 % Final    MCV 12/23/2024 90  80 - 100 fL Final    MCH 12/23/2024 30.7  26.0 - 34.0 pg Final    MCHC 12/23/2024 34.3  32.0 - 36.0 g/dL Final    RDW 12/23/2024 11.5  11.5 - 14.5 % Final    Platelets 12/23/2024 291  150 - 450 x10*3/uL Final    Neutrophils % 12/23/2024 58.5  40.0 - 80.0 % Final    Immature Granulocytes %, Automated 12/23/2024 0.3  0.0 - 0.9 % Final    Immature Granulocyte Count  (IG) includes promyelocytes, myelocytes and metamyelocytes but does not include bands. Percent differential counts (%) should be interpreted in the context of the absolute cell counts (cells/UL).    Lymphocytes % 12/23/2024 25.6  13.0 - 44.0 % Final    Monocytes % 12/23/2024 6.7  2.0 - 10.0 % Final    Eosinophils % 12/23/2024 7.4  0.0 - 6.0 % Final    Basophils % 12/23/2024 1.5  0.0 - 2.0 % Final    Neutrophils Absolute 12/23/2024 3.46  1.20 - 7.70 x10*3/uL Final    Percent differential counts (%) should be interpreted in the context of the absolute cell counts (cells/uL).    Immature Granulocytes Absolute, Au* 12/23/2024 0.02  0.00 - 0.70 x10*3/uL Final    Lymphocytes Absolute 12/23/2024 1.52  1.20 - 4.80 x10*3/uL Final    Monocytes Absolute 12/23/2024 0.40  0.10 - 1.00 x10*3/uL Final    Eosinophils Absolute 12/23/2024 0.44  0.00 - 0.70 x10*3/uL Final    Basophils Absolute 12/23/2024 0.09  0.00 - 0.10 x10*3/uL Final    Glucose 12/23/2024 93  74 - 99 mg/dL Final    Sodium 12/23/2024 140  136 - 145 mmol/L Final    Potassium 12/23/2024 4.2  3.5 - 5.3 mmol/L Final    Chloride 12/23/2024 101  98 - 107 mmol/L Final    Bicarbonate 12/23/2024 30  21 - 32 mmol/L Final    Anion Gap 12/23/2024 13  10 - 20 mmol/L Final    Urea Nitrogen 12/23/2024 15  6 - 23 mg/dL Final    Creatinine 12/23/2024 0.92  0.50 - 1.30 mg/dL Final    eGFR 12/23/2024 >90  >60 mL/min/1.73m*2 Final    Calculations of estimated GFR are performed using the 2021 CKD-EPI Study Refit equation without the race variable for the IDMS-Traceable creatinine methods.  https://jasn.asnjournals.org/content/early/2021/09/22/ASN.7229736137    Calcium 12/23/2024 10.4  8.6 - 10.6 mg/dL Final    Albumin 12/23/2024 4.7  3.4 - 5.0 g/dL Final    Alkaline Phosphatase 12/23/2024 74  33 - 120 U/L Final    Total Protein 12/23/2024 7.1  6.4 - 8.2 g/dL Final    AST 12/23/2024 21  9 - 39 U/L Final    Bilirubin, Total 12/23/2024 0.9  0.0 - 1.2 mg/dL Final    ALT 12/23/2024 41  10 -  52 U/L Final    Patients treated with Sulfasalazine may generate falsely decreased results for ALT.    Thyroid Stimulating Hormone 12/23/2024 1.09  0.44 - 3.98 mIU/L Final    Cholesterol 12/23/2024 191  0 - 199 mg/dL Final          Age      Desirable   Borderline High   High     0-19 Y     0 - 169       170 - 199     >/= 200    20-24 Y     0 - 189       190 - 224     >/= 225         >24 Y     0 - 199       200 - 239     >/= 240   **All ranges are based on fasting samples. Specific   therapeutic targets will vary based on patient-specific   cardiac risk.    Pediatric guidelines reference:Pediatrics 2011, 128(S5).Adult guidelines reference: NCEP ATPIII Guidelines,RASHAD 2001, 258:2486-97    Venipuncture immediately after or during the administration of Metamizole may lead to falsely low results. Testing should be performed immediately prior to Metamizole dosing.    HDL-Cholesterol 12/23/2024 53.7  mg/dL Final      Age       Very Low   Low     Normal    High    0-19 Y    < 35      < 40     40-45     ----  20-24 Y    ----     < 40      >45      ----        >24 Y      ----     < 40     40-60      >60      Cholesterol/HDL Ratio 12/23/2024 3.6   Final      Ref Values  Desirable  < 3.4  High Risk  > 5.0    LDL Calculated 12/23/2024 111 (H)  <=99 mg/dL Final                                Near   Borderline      AGE      Desirable  Optimal    High     High     Very High     0-19 Y     0 - 109     ---    110-129   >/= 130     ----    20-24 Y     0 - 119     ---    120-159   >/= 160     ----      >24 Y     0 -  99   100-129  130-159   160-189     >/=190      VLDL 12/23/2024 26  0 - 40 mg/dL Final    Triglycerides 12/23/2024 130  0 - 149 mg/dL Final    Age              Desirable        Borderline         High        Very High  SEX:B           mg/dL             mg/dL               mg/dL      mg/dL  <=14D                       ----               ----        ----  15D-365D                    ----               ----         ----  1Y-9Y           0-74               75-99             >=100       ----  10Y-19Y        0-89                            >=130       ----  20Y-24Y        0-114             115-149             >=150      ----  >= 25Y         0-149             150-199             200-499    >=500      Venipuncture immediately after or during the administration of Metamizole may lead to falsely low results. Testing should be performed immediately prior to Metamizole dosing.    Non HDL Cholesterol 12/23/2024 137  0 - 149 mg/dL Final          Age       Desirable   Borderline High   High     Very High     0-19 Y     0 - 119       120 - 144     >/= 145    >/= 160    20-24 Y     0 - 149       150 - 189     >/= 190      ----         >24 Y    30 mg/dL above LDL Cholesterol goal      Prostate Specific AG 12/23/2024 0.83  <=4.00 ng/mL Final     Current Outpatient Medications on File Prior to Visit   Medication Sig Dispense Refill    atorvastatin (Lipitor) 20 mg tablet TAKE 1 TABLET BY MOUTH EVERY DAY 90 tablet 3    calcium carbonate-vitamin D3 500 mg-3.125 mcg (125 unit) tablet tablet Take 1 tablet by mouth once daily.      cetirizine (ZyrTEC) 10 mg capsule Take 1 capsule (10 mg) by mouth once daily.      glucosam/chond-msm1/C/jerry/bor (GLUCOSAMINE-CHOND-MSM COMPLEX ORAL) Take 1 tablet by mouth once daily.      magnesium oxide 500 mg tablet Take 1 tablet (500 mg) by mouth once daily.      multivitamin (Multiple Vitamins) tablet Take 1 tablet by mouth once daily.      mv-mn/lutein/zeax/bilber/hb277 (MAC[employer] FORMULA ORAL) Take by mouth.      omeprazole (PriLOSEC) 20 mg DR capsule Take 1 capsule (20 mg) by mouth once daily.       No current facility-administered medications on file prior to visit.     No images are attached to the encounter.            Assessment/Plan   Problem List Items Addressed This Visit             ICD-10-CM    Changing skin lesion L98.9     Referring to dermatology specialist         HBP (high blood  pressure) I10     Blood pressure well-controlled         Hyperlipidemia E78.5     Slight elevation of LDL cholesterol continue statin therapy.    Would like to work on diet and watch her closely.  Recheck lipids in 6 months.    Ordering CT scoring of the coronary arteries         Encounter for routine history and physical exam for male - Primary Z00.00    Atopic dermatitis L20.9     Wrote for steroid cream to try to help this would like to know how you are doing next 10 days referring to dermatologist         Relevant Medications    mometasone (Elocon) 0.1 % ointment    Wheezing R06.2     Starting inhalers as noted chest x-ray go to be performed may need PFTs and further evaluation         Relevant Medications    albuterol (ProAir HFA) 90 mcg/actuation inhaler    fluticasone propion-salmeteroL (Advair Diskus) 100-50 mcg/dose diskus inhaler

## 2025-01-09 NOTE — PATIENT INSTRUCTIONS
Evaluating for wheezing.    Chest x-ray is going to be performed prescription for inhalers have been sent to pharmacy.  Important to rinse mouth out after using the Advair inhaler because it is a steroid and could cause yeast of the mouth.    EKG performed and reviewed no acute abnormalities noted    Ordering CT scoring of the coronary arteries because of the elevation of LDL cholesterol.  Going to have see dermatology specialist because of skin lesion on the back of the right leg.  It is not healing and I would like to make sure that this does not represent any type of skin cancer.    Chest x-ray is being performed and I will let you know what this shows.    Given information about shingles vaccination

## 2025-01-09 NOTE — ASSESSMENT & PLAN NOTE
Wrote for steroid cream to try to help this would like to know how you are doing next 10 days referring to dermatologist

## 2025-02-10 DIAGNOSIS — R06.2 WHEEZING: ICD-10-CM

## 2025-02-10 RX ORDER — FLUTICASONE PROPIONATE AND SALMETEROL 100; 50 UG/1; UG/1
POWDER RESPIRATORY (INHALATION)
Qty: 60 EACH | Refills: 0 | Status: SHIPPED | OUTPATIENT
Start: 2025-02-10

## 2025-03-03 DIAGNOSIS — R06.2 WHEEZING: ICD-10-CM

## 2025-03-03 RX ORDER — ALBUTEROL SULFATE 90 UG/1
2 INHALANT RESPIRATORY (INHALATION) EVERY 6 HOURS PRN
Qty: 8.5 G | Refills: 5 | Status: SHIPPED | OUTPATIENT
Start: 2025-03-03 | End: 2026-03-03

## 2025-03-03 RX ORDER — FLUTICASONE PROPIONATE AND SALMETEROL 100; 50 UG/1; UG/1
1 POWDER RESPIRATORY (INHALATION)
Qty: 180 EACH | Refills: 1 | Status: SHIPPED | OUTPATIENT
Start: 2025-03-03

## 2025-04-04 ENCOUNTER — APPOINTMENT (OUTPATIENT)
Dept: PRIMARY CARE | Facility: CLINIC | Age: 60
End: 2025-04-04
Payer: COMMERCIAL

## 2025-04-04 VITALS
HEIGHT: 69 IN | DIASTOLIC BLOOD PRESSURE: 82 MMHG | SYSTOLIC BLOOD PRESSURE: 134 MMHG | HEART RATE: 67 BPM | WEIGHT: 232 LBS | BODY MASS INDEX: 34.36 KG/M2 | TEMPERATURE: 97.6 F | OXYGEN SATURATION: 95 %

## 2025-04-04 DIAGNOSIS — R09.81 SINUS CONGESTION: ICD-10-CM

## 2025-04-04 DIAGNOSIS — K21.9 GASTROESOPHAGEAL REFLUX DISEASE, UNSPECIFIED WHETHER ESOPHAGITIS PRESENT: ICD-10-CM

## 2025-04-04 DIAGNOSIS — R06.2 WHEEZING: Primary | ICD-10-CM

## 2025-04-04 PROCEDURE — 3008F BODY MASS INDEX DOCD: CPT | Performed by: FAMILY MEDICINE

## 2025-04-04 PROCEDURE — 3079F DIAST BP 80-89 MM HG: CPT | Performed by: FAMILY MEDICINE

## 2025-04-04 PROCEDURE — 3075F SYST BP GE 130 - 139MM HG: CPT | Performed by: FAMILY MEDICINE

## 2025-04-04 PROCEDURE — 99213 OFFICE O/P EST LOW 20 MIN: CPT | Performed by: FAMILY MEDICINE

## 2025-04-04 RX ORDER — TRIAMCINOLONE ACETONIDE 55 UG/1
2 SPRAY, METERED NASAL DAILY
Start: 2025-04-04 | End: 2026-04-04

## 2025-04-04 RX ORDER — OMEPRAZOLE 20 MG/1
20 CAPSULE, DELAYED RELEASE ORAL DAILY
Qty: 90 CAPSULE | Refills: 1 | Status: SHIPPED | OUTPATIENT
Start: 2025-04-04

## 2025-04-04 ASSESSMENT — ENCOUNTER SYMPTOMS
EYE ITCHING: 0
LOSS OF SENSATION IN FEET: 0
ABDOMINAL PAIN: 0
SINUS PAIN: 0
SHORTNESS OF BREATH: 0
FREQUENCY: 0
FACIAL SWELLING: 0
DIAPHORESIS: 0
CONSTIPATION: 0
APPETITE CHANGE: 0
DEPRESSION: 0
OCCASIONAL FEELINGS OF UNSTEADINESS: 0
APNEA: 0
CHOKING: 0
DYSURIA: 0

## 2025-04-04 ASSESSMENT — PATIENT HEALTH QUESTIONNAIRE - PHQ9
SUM OF ALL RESPONSES TO PHQ9 QUESTIONS 1 AND 2: 0
10. IF YOU CHECKED OFF ANY PROBLEMS, HOW DIFFICULT HAVE THESE PROBLEMS MADE IT FOR YOU TO DO YOUR WORK, TAKE CARE OF THINGS AT HOME, OR GET ALONG WITH OTHER PEOPLE: NOT DIFFICULT AT ALL
2. FEELING DOWN, DEPRESSED OR HOPELESS: NOT AT ALL
1. LITTLE INTEREST OR PLEASURE IN DOING THINGS: NOT AT ALL

## 2025-04-04 NOTE — PROGRESS NOTES
"Subjective   Patient ID: Bradley Coates is a 60 y.o. male who presents for Follow-up (medications).    Xyzal.  Patient having some sneezing some itchy eyes and watery eyes.    Patient having no troubles with headache or double vision blurry vision no troubles with sore tongue or difficulty swallowing at no troubles with abdominal pain and discomfort.  States the inhalers    Doing well but not formally been diagnosed with asthma or COPD.  Non-smoker no pets or dogs.  Does get some itchy watery eyes some congestion     patient presents for follow-up on medications    Review of Systems   Constitutional:  Negative for appetite change and diaphoresis.   HENT:  Positive for congestion. Negative for drooling, facial swelling, mouth sores and sinus pain.    Eyes:  Negative for itching.   Respiratory:  Negative for apnea, choking and shortness of breath.    Cardiovascular:  Negative for leg swelling.   Gastrointestinal:  Negative for abdominal pain and constipation.   Endocrine: Negative for heat intolerance.   Genitourinary:  Negative for dysuria and frequency.       Objective   /82   Pulse 67   Temp 36.4 °C (97.6 °F)   Ht 1.74 m (5' 8.5\")   Wt 105 kg (232 lb)   SpO2 95%   BMI 34.76 kg/m²   BSA Body surface area is 2.25 meters squared.      Physical Exam  Constitutional:       Appearance: Normal appearance.   HENT:      Right Ear: Tympanic membrane normal.      Mouth/Throat:      Mouth: Mucous membranes are dry.   Cardiovascular:      Rate and Rhythm: Normal rate and regular rhythm.   Pulmonary:      Effort: Pulmonary effort is normal.      Breath sounds: Normal breath sounds.   Abdominal:      General: There is no distension.      Palpations: There is no mass.   Musculoskeletal:      Cervical back: No rigidity.   Neurological:      Mental Status: He is alert.       Lab on 12/23/2024   Component Date Value Ref Range Status    WBC 12/23/2024 5.9  4.4 - 11.3 x10*3/uL Final    nRBC 12/23/2024 0.0  0.0 - 0.0 /100 " WBCs Final    RBC 12/23/2024 5.15  4.50 - 5.90 x10*6/uL Final    Hemoglobin 12/23/2024 15.8  13.5 - 17.5 g/dL Final    Hematocrit 12/23/2024 46.1  41.0 - 52.0 % Final    MCV 12/23/2024 90  80 - 100 fL Final    MCH 12/23/2024 30.7  26.0 - 34.0 pg Final    MCHC 12/23/2024 34.3  32.0 - 36.0 g/dL Final    RDW 12/23/2024 11.5  11.5 - 14.5 % Final    Platelets 12/23/2024 291  150 - 450 x10*3/uL Final    Neutrophils % 12/23/2024 58.5  40.0 - 80.0 % Final    Immature Granulocytes %, Automated 12/23/2024 0.3  0.0 - 0.9 % Final    Immature Granulocyte Count (IG) includes promyelocytes, myelocytes and metamyelocytes but does not include bands. Percent differential counts (%) should be interpreted in the context of the absolute cell counts (cells/UL).    Lymphocytes % 12/23/2024 25.6  13.0 - 44.0 % Final    Monocytes % 12/23/2024 6.7  2.0 - 10.0 % Final    Eosinophils % 12/23/2024 7.4  0.0 - 6.0 % Final    Basophils % 12/23/2024 1.5  0.0 - 2.0 % Final    Neutrophils Absolute 12/23/2024 3.46  1.20 - 7.70 x10*3/uL Final    Percent differential counts (%) should be interpreted in the context of the absolute cell counts (cells/uL).    Immature Granulocytes Absolute, Au* 12/23/2024 0.02  0.00 - 0.70 x10*3/uL Final    Lymphocytes Absolute 12/23/2024 1.52  1.20 - 4.80 x10*3/uL Final    Monocytes Absolute 12/23/2024 0.40  0.10 - 1.00 x10*3/uL Final    Eosinophils Absolute 12/23/2024 0.44  0.00 - 0.70 x10*3/uL Final    Basophils Absolute 12/23/2024 0.09  0.00 - 0.10 x10*3/uL Final    Glucose 12/23/2024 93  74 - 99 mg/dL Final    Sodium 12/23/2024 140  136 - 145 mmol/L Final    Potassium 12/23/2024 4.2  3.5 - 5.3 mmol/L Final    Chloride 12/23/2024 101  98 - 107 mmol/L Final    Bicarbonate 12/23/2024 30  21 - 32 mmol/L Final    Anion Gap 12/23/2024 13  10 - 20 mmol/L Final    Urea Nitrogen 12/23/2024 15  6 - 23 mg/dL Final    Creatinine 12/23/2024 0.92  0.50 - 1.30 mg/dL Final    eGFR 12/23/2024 >90  >60 mL/min/1.73m*2 Final     Calculations of estimated GFR are performed using the 2021 CKD-EPI Study Refit equation without the race variable for the IDMS-Traceable creatinine methods.  https://jasn.asnjournals.org/content/early/2021/09/22/ASN.1734666959    Calcium 12/23/2024 10.4  8.6 - 10.6 mg/dL Final    Albumin 12/23/2024 4.7  3.4 - 5.0 g/dL Final    Alkaline Phosphatase 12/23/2024 74  33 - 120 U/L Final    Total Protein 12/23/2024 7.1  6.4 - 8.2 g/dL Final    AST 12/23/2024 21  9 - 39 U/L Final    Bilirubin, Total 12/23/2024 0.9  0.0 - 1.2 mg/dL Final    ALT 12/23/2024 41  10 - 52 U/L Final    Patients treated with Sulfasalazine may generate falsely decreased results for ALT.    Thyroid Stimulating Hormone 12/23/2024 1.09  0.44 - 3.98 mIU/L Final    Cholesterol 12/23/2024 191  0 - 199 mg/dL Final          Age      Desirable   Borderline High   High     0-19 Y     0 - 169       170 - 199     >/= 200    20-24 Y     0 - 189       190 - 224     >/= 225         >24 Y     0 - 199       200 - 239     >/= 240   **All ranges are based on fasting samples. Specific   therapeutic targets will vary based on patient-specific   cardiac risk.    Pediatric guidelines reference:Pediatrics 2011, 128(S5).Adult guidelines reference: NCEP ATPIII Guidelines,RASHAD 2001, 258:2486-97    Venipuncture immediately after or during the administration of Metamizole may lead to falsely low results. Testing should be performed immediately prior to Metamizole dosing.    HDL-Cholesterol 12/23/2024 53.7  mg/dL Final      Age       Very Low   Low     Normal    High    0-19 Y    < 35      < 40     40-45     ----  20-24 Y    ----     < 40      >45      ----        >24 Y      ----     < 40     40-60      >60      Cholesterol/HDL Ratio 12/23/2024 3.6   Final      Ref Values  Desirable  < 3.4  High Risk  > 5.0    LDL Calculated 12/23/2024 111 (H)  <=99 mg/dL Final                                Near   Borderline      AGE      Desirable  Optimal    High     High     Very High      0-19 Y     0 - 109     ---    110-129   >/= 130     ----    20-24 Y     0 - 119     ---    120-159   >/= 160     ----      >24 Y     0 -  99   100-129  130-159   160-189     >/=190      VLDL 12/23/2024 26  0 - 40 mg/dL Final    Triglycerides 12/23/2024 130  0 - 149 mg/dL Final    Age              Desirable        Borderline         High        Very High  SEX:B           mg/dL             mg/dL               mg/dL      mg/dL  <=14D                       ----               ----        ----  15D-365D                    ----               ----        ----  1Y-9Y           0-74               75-99             >=100       ----  10Y-19Y        0-89                            >=130       ----  20Y-24Y        0-114             115-149             >=150      ----  >= 25Y         0-149             150-199             200-499    >=500      Venipuncture immediately after or during the administration of Metamizole may lead to falsely low results. Testing should be performed immediately prior to Metamizole dosing.    Non HDL Cholesterol 12/23/2024 137  0 - 149 mg/dL Final          Age       Desirable   Borderline High   High     Very High     0-19 Y     0 - 119       120 - 144     >/= 145    >/= 160    20-24 Y     0 - 149       150 - 189     >/= 190      ----         >24 Y    30 mg/dL above LDL Cholesterol goal      Prostate Specific AG 12/23/2024 0.83  <=4.00 ng/mL Final     Current Outpatient Medications on File Prior to Visit   Medication Sig Dispense Refill    albuterol (ProAir HFA) 90 mcg/actuation inhaler Inhale 2 puffs every 6 hours if needed for wheezing or shortness of breath. 8.5 g 5    atorvastatin (Lipitor) 20 mg tablet TAKE 1 TABLET BY MOUTH EVERY DAY 90 tablet 3    calcium carbonate-vitamin D3 500 mg-3.125 mcg (125 unit) tablet tablet Take 1 tablet by mouth once daily.      cetirizine (ZyrTEC) 10 mg capsule Take 1 capsule (10 mg) by mouth once daily.      fluticasone propion-salmeteroL (Advair  Diskus) 100-50 mcg/dose diskus inhaler Inhale 1 puff 2 times a day. Rinse mouth with water after use to reduce aftertaste and incidence of candidiasis. Do not swallow. 180 each 1    glucosam/chond-msm1/C/jerry/bor (GLUCOSAMINE-CHOND-MSM COMPLEX ORAL) Take 1 tablet by mouth once daily.      magnesium oxide 500 mg tablet Take 1 tablet (500 mg) by mouth once daily.      mometasone (Elocon) 0.1 % ointment Apply topically once daily. apply to affected area 45 g 0    multivitamin (Multiple Vitamins) tablet Take 1 tablet by mouth once daily.      mv-mn/lutein/zeax/bilber/hb277 (MACULAR HEALTH FORMULA ORAL) Take by mouth.      omeprazole (PriLOSEC) 20 mg DR capsule Take 1 capsule (20 mg) by mouth once daily.       No current facility-administered medications on file prior to visit.     No images are attached to the encounter.            Assessment/Plan

## 2025-04-04 NOTE — PATIENT INSTRUCTIONS
Please add Nasacort back to therapy please continue with Zyrtec as well.    Please continue with your inhalers.    Formal pulmonary function test going to be performed.    Please follow-up with Dr. Perkins as directed.    Reviewed x-ray which is normal

## 2025-04-23 ENCOUNTER — HOSPITAL ENCOUNTER (OUTPATIENT)
Dept: RESPIRATORY THERAPY | Facility: HOSPITAL | Age: 60
Discharge: HOME | End: 2025-04-23
Payer: COMMERCIAL

## 2025-04-23 DIAGNOSIS — R06.2 WHEEZING: ICD-10-CM

## 2025-04-23 PROCEDURE — 94060 EVALUATION OF WHEEZING: CPT | Performed by: STUDENT IN AN ORGANIZED HEALTH CARE EDUCATION/TRAINING PROGRAM

## 2025-04-23 PROCEDURE — 94726 PLETHYSMOGRAPHY LUNG VOLUMES: CPT | Performed by: STUDENT IN AN ORGANIZED HEALTH CARE EDUCATION/TRAINING PROGRAM

## 2025-04-23 PROCEDURE — 94726 PLETHYSMOGRAPHY LUNG VOLUMES: CPT

## 2025-04-23 PROCEDURE — 2500000001 HC RX 250 WO HCPCS SELF ADMINISTERED DRUGS (ALT 637 FOR MEDICARE OP): Performed by: FAMILY MEDICINE

## 2025-04-23 PROCEDURE — 94729 DIFFUSING CAPACITY: CPT | Performed by: STUDENT IN AN ORGANIZED HEALTH CARE EDUCATION/TRAINING PROGRAM

## 2025-04-23 RX ORDER — ALBUTEROL SULFATE 90 UG/1
4 INHALANT RESPIRATORY (INHALATION) ONCE
Status: COMPLETED | OUTPATIENT
Start: 2025-04-23 | End: 2025-04-23

## 2025-04-23 RX ADMIN — ALBUTEROL SULFATE 4 PUFF: 90 AEROSOL, METERED RESPIRATORY (INHALATION) at 15:36

## 2025-04-24 DIAGNOSIS — J98.4 INFLAMMATION OF LUNG: ICD-10-CM

## 2025-04-24 DIAGNOSIS — R06.2 WHEEZING: ICD-10-CM

## 2025-04-24 LAB
MGC ASCENT PFT - FEV1 - POST: 3.74
MGC ASCENT PFT - FEV1 - PRE: 3.69
MGC ASCENT PFT - FEV1 - PREDICTED: 3.27
MGC ASCENT PFT - FVC - POST: 4.93
MGC ASCENT PFT - FVC - PRE: 5.01
MGC ASCENT PFT - FVC - PREDICTED: 4.17

## 2025-05-12 ENCOUNTER — APPOINTMENT (OUTPATIENT)
Dept: PRIMARY CARE | Facility: CLINIC | Age: 60
End: 2025-05-12
Payer: COMMERCIAL

## 2025-07-22 ENCOUNTER — HOSPITAL ENCOUNTER (OUTPATIENT)
Dept: RADIOLOGY | Facility: CLINIC | Age: 60
Discharge: HOME | End: 2025-07-22
Payer: COMMERCIAL

## 2025-07-22 DIAGNOSIS — Z00.00 ROUTINE ADULT HEALTH MAINTENANCE: ICD-10-CM

## 2025-07-22 PROCEDURE — 75571 CT HRT W/O DYE W/CA TEST: CPT

## 2025-09-04 DIAGNOSIS — Z00.00 ENCOUNTER FOR GENERAL ADULT MEDICAL EXAMINATION WITHOUT ABNORMAL FINDINGS: ICD-10-CM

## 2025-09-04 RX ORDER — ATORVASTATIN CALCIUM 20 MG/1
20 TABLET, FILM COATED ORAL
Qty: 90 TABLET | Refills: 3 | Status: SHIPPED | OUTPATIENT
Start: 2025-09-04